# Patient Record
Sex: FEMALE | Race: WHITE | Employment: FULL TIME | URBAN - METROPOLITAN AREA
[De-identification: names, ages, dates, MRNs, and addresses within clinical notes are randomized per-mention and may not be internally consistent; named-entity substitution may affect disease eponyms.]

---

## 2018-01-11 ENCOUNTER — OFFICE VISIT (OUTPATIENT)
Dept: FAMILY MEDICINE CLINIC | Facility: CLINIC | Age: 28
End: 2018-01-11

## 2018-01-11 VITALS
TEMPERATURE: 98 F | WEIGHT: 150 LBS | BODY MASS INDEX: 22.47 KG/M2 | SYSTOLIC BLOOD PRESSURE: 114 MMHG | HEART RATE: 123 BPM | HEIGHT: 68.5 IN | DIASTOLIC BLOOD PRESSURE: 75 MMHG

## 2018-01-11 DIAGNOSIS — Z00.00 ROUTINE GENERAL MEDICAL EXAMINATION AT A HEALTH CARE FACILITY: Primary | ICD-10-CM

## 2018-01-11 PROCEDURE — 99395 PREV VISIT EST AGE 18-39: CPT | Performed by: FAMILY MEDICINE

## 2018-01-11 NOTE — PROGRESS NOTES
HPI:    Patient ID: Juliane Primrose is a 24year old female. HPI  Patient presents with:  Routine Physical  Cough: c/o cough for 2 weeks    Review of Systems   Constitutional: Negative. HENT: Negative. Eyes: Negative. Respiratory: Negative. suspicious lesions above the waist exam   Psychiatric: She has a normal mood and affect. Her mood appears not anxious. She does not exhibit a depressed mood.               ASSESSMENT/PLAN:   Routine general medical examination at a health care facility  (pr

## 2018-06-19 ENCOUNTER — OFFICE VISIT (OUTPATIENT)
Dept: FAMILY MEDICINE CLINIC | Facility: CLINIC | Age: 28
End: 2018-06-19

## 2018-06-19 VITALS
WEIGHT: 154 LBS | BODY MASS INDEX: 22.81 KG/M2 | DIASTOLIC BLOOD PRESSURE: 89 MMHG | TEMPERATURE: 99 F | HEART RATE: 112 BPM | SYSTOLIC BLOOD PRESSURE: 135 MMHG | HEIGHT: 69 IN

## 2018-06-19 DIAGNOSIS — L70.9 ACNE, MILD: ICD-10-CM

## 2018-06-19 DIAGNOSIS — Z00.00 WELL ADULT EXAM: Primary | ICD-10-CM

## 2018-06-19 PROCEDURE — 99395 PREV VISIT EST AGE 18-39: CPT | Performed by: FAMILY MEDICINE

## 2018-06-19 NOTE — PROGRESS NOTES
HPI:   Rissa Schwarz is a 24year old female who presents for a complete physical exam. Symptoms: periods are regular.     Wt Readings from Last 6 Encounters:  06/19/18 : 154 lb (69.9 kg)  01/11/18 : 150 lb (68 kg)  03/19/16 : 147 lb (66.7 kg)  05/07/15 : discharge or itching,periods regular   MUSCULOSKELETAL: denies back pain  NEURO: denies headaches  PSYCHE: denies depression or anxiety  HEMATOLOGIC: denies hx of anemia  ENDOCRINE: denies thyroid history  ALL/ASTHMA: denies hx of allergy or asthma    EXAM T4    FORM IN PENDING AT [facility] (WORK WELLNESS FORM)    DECLINED TDAP    2. Acne, mild    - DERM - INTERNAL      Neville Voss MD  6/19/2018  4:14 PM

## 2018-06-28 ENCOUNTER — TELEPHONE (OUTPATIENT)
Dept: FAMILY MEDICINE CLINIC | Facility: CLINIC | Age: 28
End: 2018-06-28

## 2018-08-29 ENCOUNTER — TELEPHONE (OUTPATIENT)
Dept: FAMILY MEDICINE CLINIC | Facility: CLINIC | Age: 28
End: 2018-08-29

## 2019-02-18 ENCOUNTER — OFFICE VISIT (OUTPATIENT)
Dept: FAMILY MEDICINE CLINIC | Facility: CLINIC | Age: 29
End: 2019-02-18
Payer: COMMERCIAL

## 2019-02-18 VITALS
BODY MASS INDEX: 23.4 KG/M2 | DIASTOLIC BLOOD PRESSURE: 86 MMHG | TEMPERATURE: 99 F | SYSTOLIC BLOOD PRESSURE: 132 MMHG | HEIGHT: 69 IN | HEART RATE: 106 BPM | WEIGHT: 158 LBS

## 2019-02-18 DIAGNOSIS — I10 WHITE COAT SYNDROME WITH DIAGNOSIS OF HYPERTENSION: ICD-10-CM

## 2019-02-18 DIAGNOSIS — R93.89 ABNORMAL IMAGING OF THYROID: Primary | ICD-10-CM

## 2019-02-18 PROCEDURE — 99212 OFFICE O/P EST SF 10 MIN: CPT | Performed by: FAMILY MEDICINE

## 2019-02-18 PROCEDURE — 99213 OFFICE O/P EST LOW 20 MIN: CPT | Performed by: FAMILY MEDICINE

## 2019-02-18 RX ORDER — KETOCONAZOLE 20 MG/ML
SHAMPOO TOPICAL
Refills: 12 | COMMUNITY
Start: 2018-10-26 | End: 2019-06-20

## 2019-02-18 RX ORDER — CLINDAMYCIN AND BENZOYL PEROXIDE 10; 50 MG/G; MG/G
GEL TOPICAL
Refills: 12 | COMMUNITY
Start: 2018-10-26

## 2019-02-18 NOTE — PROGRESS NOTES
HPI:    Patient ID: Monica Schafer is a 34year old female. HPI  Patient presents with:   Other: pt states she had a thyroid ultrasound by one of her friends who is going to school for a sonographer and told that she found something    Denies any troubl of thyroid    - US THYROID (DDA=[id number]); Future    2. White coat syndrome with diagnosis of hypertension        No orders of the defined types were placed in this encounter.       Meds This Visit:  Requested Prescriptions      No prescriptions requested or o

## 2019-02-19 ENCOUNTER — HOSPITAL ENCOUNTER (OUTPATIENT)
Dept: ULTRASOUND IMAGING | Age: 29
Discharge: HOME | End: 2019-02-19
Attending: FAMILY MEDICINE
Payer: COMMERCIAL

## 2019-02-19 DIAGNOSIS — E04.2 MULTINODULAR GOITER: Primary | ICD-10-CM

## 2019-02-19 DIAGNOSIS — R93.89 ABNORMAL IMAGING OF THYROID: ICD-10-CM

## 2019-02-19 PROCEDURE — 76536 US EXAM OF HEAD AND NECK: CPT | Performed by: FAMILY MEDICINE

## 2019-03-19 ENCOUNTER — OFFICE VISIT (OUTPATIENT)
Dept: ENDOCRINOLOGY CLINIC | Facility: CLINIC | Age: 29
End: 2019-03-19
Payer: COMMERCIAL

## 2019-03-19 VITALS
HEART RATE: 126 BPM | SYSTOLIC BLOOD PRESSURE: 149 MMHG | BODY MASS INDEX: 23.59 KG/M2 | HEIGHT: 68 IN | DIASTOLIC BLOOD PRESSURE: 87 MMHG | WEIGHT: 155.63 LBS

## 2019-03-19 DIAGNOSIS — E04.1 THYROID NODULE: Primary | ICD-10-CM

## 2019-03-19 PROCEDURE — 99243 OFF/OP CNSLTJ NEW/EST LOW 30: CPT | Performed by: INTERNAL MEDICINE

## 2019-03-19 NOTE — PROGRESS NOTES
New Patient Evaluation - History and Physical    CONSULT - Reason for Visit:  Thyroid nodule  Requesting Physician: Dr. Lainey Peres:  Patient presents with:  Consult: Multinodular goiter       HISTORY OF PRESENT ILLNESS:   Aries Delarosa is a 34 Alcohol use: Yes        Comment: occasional      Drug use: No      Sexual activity: Yes    Other Topics      Concerns:        Caffeine Concern: Yes          soda-8 oz./day      FAMILY HISTORY:   Family History   Problem Relation Age of Onset   • Hypertensi rate and rhythm, normal S1 and S2  ABDOMEN:  normal bowel sounds, soft, non-distended, non-tender  SKIN:  no bruising or bleeding, no rashes and no lesions        DATA:     Pertinent data reviewed    ASSESSMENT AND PLAN:    Patient is a 34year old female

## 2019-03-20 ENCOUNTER — TELEPHONE (OUTPATIENT)
Dept: ENDOCRINOLOGY CLINIC | Facility: CLINIC | Age: 29
End: 2019-03-20

## 2019-03-20 DIAGNOSIS — E04.1 THYROID NODULE: Primary | ICD-10-CM

## 2019-03-20 NOTE — TELEPHONE ENCOUNTER
Pt has decided to proceed with biopsy of thyroid nodule per discussion at [facility]. Forwarded to AM to place order. Pt aware provider returns to clinic on Friday 3/22/19.

## 2019-03-22 NOTE — TELEPHONE ENCOUNTER
Spoke to pt, relayed Dr. Jodee Hitchcock message as shown below. Central scheduling phone number provided to pt. Pt verbalized understanding and had no further questions at this time. No further action required at this time.

## 2019-04-04 ENCOUNTER — HOSPITAL ENCOUNTER (OUTPATIENT)
Dept: ULTRASOUND IMAGING | Facility: HOSPITAL | Age: 29
Discharge: HOME | End: 2019-04-04
Attending: INTERNAL MEDICINE
Payer: COMMERCIAL

## 2019-04-04 VITALS
DIASTOLIC BLOOD PRESSURE: 78 MMHG | BODY MASS INDEX: 23.49 KG/M2 | HEART RATE: 83 BPM | HEIGHT: 68 IN | WEIGHT: 155 LBS | SYSTOLIC BLOOD PRESSURE: 122 MMHG

## 2019-04-04 DIAGNOSIS — E04.1 THYROID NODULE: ICD-10-CM

## 2019-04-04 PROCEDURE — 88172 CYTP DX EVAL FNA 1ST EA SITE: CPT | Performed by: INTERNAL MEDICINE

## 2019-04-04 PROCEDURE — 88173 CYTOPATH EVAL FNA REPORT: CPT | Performed by: INTERNAL MEDICINE

## 2019-04-04 PROCEDURE — 88177 CYTP FNA EVAL EA ADDL: CPT | Performed by: INTERNAL MEDICINE

## 2019-04-04 PROCEDURE — 10005 FNA BX W/US GDN 1ST LES: CPT | Performed by: INTERNAL MEDICINE

## 2019-04-04 NOTE — IMAGING NOTE
1400: Hx taken:  Miss Micheline Brar reported  thyroid nodules were self discovered, primary care physician recommended ultrasound, biopsy recommended. Denies smoking and or hormone use. Reports paternal family history of thyroid disease.     1410:  Order verif

## 2019-04-04 NOTE — PROCEDURES
[facility]  Procedure Note    Jeana Albert Patient Status:  Outpatient    9/10/1990 MRN [id number]   Location [address] Attending Natalee España MD   Hosp Day # 0 PCP Neville Voss MD     Procedure: Wilfrid Bailey

## 2019-04-05 ENCOUNTER — TELEPHONE (OUTPATIENT)
Dept: ENDOCRINOLOGY CLINIC | Facility: CLINIC | Age: 29
End: 2019-04-05

## 2019-04-05 DIAGNOSIS — E04.1 THYROID NODULE: Primary | ICD-10-CM

## 2019-04-05 NOTE — TELEPHONE ENCOUNTER
Spoke with Yovana Cox. She verbalizes understanding of benign result. At this time she is leaning towards repeat thyroid u/s in 5-6 months but does request referral to ENT in case she changes her mind. Referral and ultrasound ordered.  Provided phone number to

## 2019-04-05 NOTE — TELEPHONE ENCOUNTER
FNA of the right thyroid nodules is benign. Since the nodule is big in size, we had discussed the option of surgery also. Will she like to see ENT?    If yes, please provide referral  If no, then please order Thyroid US in 5-6 months  FU in clinic after

## 2019-06-20 ENCOUNTER — OFFICE VISIT (OUTPATIENT)
Dept: FAMILY MEDICINE CLINIC | Facility: CLINIC | Age: 29
End: 2019-06-20
Payer: COMMERCIAL

## 2019-06-20 VITALS
HEART RATE: 111 BPM | DIASTOLIC BLOOD PRESSURE: 81 MMHG | BODY MASS INDEX: 23.11 KG/M2 | SYSTOLIC BLOOD PRESSURE: 118 MMHG | HEIGHT: 69 IN | WEIGHT: 156 LBS | TEMPERATURE: 98 F

## 2019-06-20 DIAGNOSIS — E04.2 MULTINODULAR GOITER: ICD-10-CM

## 2019-06-20 DIAGNOSIS — N64.4 BREAST PAIN, RIGHT: ICD-10-CM

## 2019-06-20 DIAGNOSIS — Z00.00 WELL ADULT EXAM: Primary | ICD-10-CM

## 2019-06-20 PROCEDURE — 99395 PREV VISIT EST AGE 18-39: CPT | Performed by: FAMILY MEDICINE

## 2019-06-20 NOTE — PROGRESS NOTES
HPI:    Patient ID: Steve Talley is a 34year old female. HPI  Patient presents with: Well Adult    Right breast feels sensitive, breast pain before her menses, but then goes away when she has her cycle. Not pregnant  Not using condoms always.   Danny Date)   BMI 23.04 kg/m²     Past Medical History:   Diagnosis Date   • Breast lump 2010    removed     Past Surgical History:   Procedure Laterality Date   • BREAST LUMPECTOMY       Social History    Socioeconomic History      Marital status: Single      S Bike Helmet: Not Asked        Seat Belt: Not Asked        Self-Exams: Not Asked    Social History Narrative      Not on file    Family History   Problem Relation Age of Onset   • Hypertension Mother         Pulmonary hypertension   • Other (pulmonary htn) or bleeding. Abdominal: Soft. Bowel sounds are normal. She exhibits no mass. There is no tenderness. Genitourinary:   Genitourinary Comments: Breasts lumpy, non tender  No discrete mass.   No nipple discharge   Musculoskeletal: She exhibits no edema or

## 2019-07-25 ENCOUNTER — HOSPITAL ENCOUNTER (OUTPATIENT)
Dept: MAMMOGRAPHY | Facility: HOSPITAL | Age: 29
Discharge: HOME | End: 2019-07-25
Attending: FAMILY MEDICINE
Payer: COMMERCIAL

## 2019-07-25 DIAGNOSIS — N63.10 BREAST MASS, RIGHT: Primary | ICD-10-CM

## 2019-07-25 DIAGNOSIS — N64.4 BREAST PAIN, RIGHT: ICD-10-CM

## 2019-07-25 PROCEDURE — 76642 ULTRASOUND BREAST LIMITED: CPT | Performed by: FAMILY MEDICINE

## 2020-11-09 NOTE — TELEPHONE ENCOUNTER
Tried calling pt a few times but does not answer wanted to inform her that the day she came for her physical we did not do the waist circumference.  Wellness screening form faxed to [phone] 1 person assist

## (undated) NOTE — LETTER
11/5/2019              [address] [address]         Dear [facility],    [facility] records indicate that the THYROID ULTRASOUND ordered for you by Ana Maria Cox MD  have not been done.   If you have, in fact, already

## (undated) NOTE — Clinical Note
Thank you for the consult. I saw Ms. Collier in the endocrine/diabetes clinic today. Please see attached my note. Please feel free to contact me with any questions. Thanks!

## (undated) NOTE — LETTER
10/22/19        Jeana Gone  [address] [facility] [address]      Dear Yajaira Troy,    Our records indicate that you have outstanding lab work and or testing that was ordered for you and has not yet been completed:  Orders Placed This Encounter

## (undated) NOTE — LETTER
02/12/18        [address]  [address] [place] [address]      Dear Saurabh Garrison,    [facility] records indicate that you have outstanding lab work and or testing that was ordered for you and has not yet been completed:          CBC With Differential W

## (undated) NOTE — LETTER
[address] [place] [address]  Authorization for Surgical Operation or Procedure    1.  I hereby authorize Dr. Olena Diane, my physician and the assistant, to perform the following operation and/or procedure:  Ultrasound-Guided Fine Needle A 5. I consent to the photographing of the operations or procedures to be performed for the purposes of advancing medicine, science, and/or education, provided my identity is not revealed.  If the procedure has been videotaped, the physician/surgeon will obta in any product or implant, or other significant relationship used in the procedure/surgery, I have disclosed this and had a discussion with my patient.   Signature of Physician:   _________________________________Date:_____________Time:________    Patient N